# Patient Record
Sex: MALE | Race: WHITE | NOT HISPANIC OR LATINO | Employment: FULL TIME | URBAN - NONMETROPOLITAN AREA
[De-identification: names, ages, dates, MRNs, and addresses within clinical notes are randomized per-mention and may not be internally consistent; named-entity substitution may affect disease eponyms.]

---

## 2019-04-30 ENCOUNTER — HOSPITAL ENCOUNTER (OUTPATIENT)
Dept: ULTRASOUND IMAGING | Facility: HOSPITAL | Age: 59
Discharge: HOME | End: 2019-04-30

## 2019-04-30 ENCOUNTER — HOSPITAL ENCOUNTER (OUTPATIENT)
Dept: ULTRASOUND IMAGING | Facility: HOSPITAL | Age: 59
Discharge: HOME | End: 2019-04-30
Admitting: UROLOGY

## 2019-04-30 ENCOUNTER — OFFICE VISIT (OUTPATIENT)
Dept: UROLOGY | Facility: CLINIC | Age: 59
End: 2019-04-30

## 2019-04-30 VITALS — BODY MASS INDEX: 25.23 KG/M2 | HEIGHT: 74 IN | WEIGHT: 196.6 LBS

## 2019-04-30 DIAGNOSIS — R79.89 ABNORMAL LFTS (LIVER FUNCTION TESTS): ICD-10-CM

## 2019-04-30 DIAGNOSIS — N42.9 DISORDER OF PROSTATE: ICD-10-CM

## 2019-04-30 DIAGNOSIS — N50.819 ORCHALGIA: ICD-10-CM

## 2019-04-30 DIAGNOSIS — R68.82 DECREASED LIBIDO: Primary | ICD-10-CM

## 2019-04-30 DIAGNOSIS — N52.9 ERECTILE DYSFUNCTION, UNSPECIFIED ERECTILE DYSFUNCTION TYPE: ICD-10-CM

## 2019-04-30 DIAGNOSIS — R53.83 TIRED: ICD-10-CM

## 2019-04-30 PROCEDURE — 84153 ASSAY OF PSA TOTAL: CPT | Performed by: UROLOGY

## 2019-04-30 PROCEDURE — 76700 US EXAM ABDOM COMPLETE: CPT

## 2019-04-30 PROCEDURE — 76870 US EXAM SCROTUM: CPT

## 2019-04-30 PROCEDURE — 84403 ASSAY OF TOTAL TESTOSTERONE: CPT | Performed by: UROLOGY

## 2019-04-30 PROCEDURE — 76700 US EXAM ABDOM COMPLETE: CPT | Performed by: RADIOLOGY

## 2019-04-30 PROCEDURE — 99204 OFFICE O/P NEW MOD 45 MIN: CPT | Performed by: UROLOGY

## 2019-04-30 PROCEDURE — 80076 HEPATIC FUNCTION PANEL: CPT | Performed by: UROLOGY

## 2019-04-30 PROCEDURE — 76870 US EXAM SCROTUM: CPT | Performed by: RADIOLOGY

## 2019-04-30 PROCEDURE — 84443 ASSAY THYROID STIM HORMONE: CPT | Performed by: UROLOGY

## 2019-04-30 PROCEDURE — 36415 COLL VENOUS BLD VENIPUNCTURE: CPT | Performed by: UROLOGY

## 2019-04-30 RX ORDER — SILDENAFIL 100 MG/1
100 TABLET, FILM COATED ORAL DAILY PRN
Qty: 20 TABLET | Refills: 11 | Status: SHIPPED | OUTPATIENT
Start: 2019-04-30

## 2019-04-30 NOTE — PROGRESS NOTES
Chief Complaint:          Left testicle pain.    HPI:   59 y.o. male.  Pt has left testicle pain that is a mild irritation and has a throbbing nature it is 2 on scale of 10.  The discomfort does not interfere with work.  Pt has no sexual libido.  Pt has issues with maintaining erections.  HPI      Past Medical History:      History reviewed. No pertinent past medical history.      Current Meds:     No current outpatient medications on file.     No current facility-administered medications for this visit.         Allergies:      No Known Allergies     Past Surgical History:     History reviewed. No pertinent surgical history.      Social History:     Social History     Socioeconomic History   • Marital status:      Spouse name: Not on file   • Number of children: Not on file   • Years of education: Not on file   • Highest education level: Not on file   Tobacco Use   • Smoking status: Never Smoker   • Smokeless tobacco: Never Used   Substance and Sexual Activity   • Alcohol use: No     Frequency: Never   • Drug use: No       Family History:     Family History   Problem Relation Age of Onset   • No Known Problems Father    • Heart disease Mother    • Hypertension Mother        Review of Systems:     Review of Systems   Constitutional: Positive for fatigue.   HENT: Positive for sinus pressure. Negative for sinus pain.    Eyes: Negative for pain and redness.   Respiratory: Negative for chest tightness.    Cardiovascular: Negative for chest pain.   Gastrointestinal: Positive for abdominal pain. Negative for constipation and diarrhea.   Endocrine: Negative for heat intolerance.   Genitourinary: Positive for testicular pain. Negative for frequency and penile pain.   Musculoskeletal: Negative for back pain.   Skin: Negative for rash.   Allergic/Immunologic: Negative for food allergies.   Neurological: Negative for headaches.   Hematological: Bruises/bleeds easily.   Psychiatric/Behavioral: The patient is not  "nervous/anxious.              I have reviewed the follow portions of the patient's history and confirmed they are accurate today:  allergies, current medications, past family history, past medical history, past social history, past surgical history, problem list and ROS  Physical Exam:     Physical Exam   Constitutional: He is oriented to person, place, and time.   HENT:   Head: Normocephalic and atraumatic.   Right Ear: External ear normal.   Left Ear: External ear normal.   Nose: Nose normal.   Mouth/Throat: Oropharynx is clear and moist.   Eyes: Conjunctivae and EOM are normal. Pupils are equal, round, and reactive to light.   Neck: Normal range of motion. Neck supple. No thyromegaly present.   Cardiovascular: Normal rate, regular rhythm, normal heart sounds and intact distal pulses.   No murmur heard.  Pulmonary/Chest: Effort normal and breath sounds normal. No respiratory distress. He has no wheezes. He has no rales. He exhibits no tenderness.   Abdominal: Soft. Bowel sounds are normal. He exhibits no distension and no mass. There is no tenderness. No hernia.   Genitourinary: Rectum normal, prostate normal and penis normal.   Genitourinary Comments: Enlarged prostate no nodules   Musculoskeletal: Normal range of motion. He exhibits no edema or tenderness.   Lymphadenopathy:     He has no cervical adenopathy.   Neurological: He is alert and oriented to person, place, and time. No cranial nerve deficit. He exhibits normal muscle tone. Coordination normal.   Skin: Skin is warm. No rash noted.   Psychiatric: He has a normal mood and affect. His behavior is normal. Judgment and thought content normal.   Nursing note and vitals reviewed.      Ht 188 cm (74\")   Wt 89.2 kg (196 lb 9.6 oz)   BMI 25.24 kg/m²    Procedure:         Assessment:     Encounter Diagnoses   Name Primary?   • Decreased libido Yes   • Tired    • Erectile dysfunction, unspecified erectile dysfunction type    • Disorder of prostate    • Abnormal " LFTs (liver function tests)    • Orchalgia      No orders of the defined types were placed in this encounter.      Plan:   Will check a scrotal ultrasound for orchalgia  Will check a psa for his prostate  Will check an abdominal ultrasound for his hx of abnormal lft's   Will repeat his lft's  Will check a tsh and a testosterone for his tiredness and loss of libido  Will rx sildenafil for ED.  I discussed how it should be taken.    Patient's Body mass index is 25.24 kg/m². BMI is within normal parameters. No follow-up required..      Counseling was given to patient and family for the following topics impressions as follows: tiredness, ED and orchalgia and loss of libido. The interim medical history and current results were reviewed.  A treatment plan with follow-up was made for Decreased libido [R68.82].  This document has been electronically signed by Trav Rodarte MD April 30, 2019 3:46 PM

## 2019-05-01 LAB
ALBUMIN SERPL-MCNC: 4.7 G/DL (ref 3.5–5.2)
ALP SERPL-CCNC: 78 U/L (ref 39–117)
ALT SERPL W P-5'-P-CCNC: 47 U/L (ref 1–41)
AST SERPL-CCNC: 46 U/L (ref 1–40)
BILIRUB CONJ SERPL-MCNC: <0.2 MG/DL (ref 0.2–0.3)
BILIRUB INDIRECT SERPL-MCNC: ABNORMAL MG/DL
BILIRUB SERPL-MCNC: 1 MG/DL (ref 0.2–1.2)
PROT SERPL-MCNC: 7.6 G/DL (ref 6–8.5)
PSA SERPL-MCNC: 1.04 NG/ML (ref 0–4)
TESTOST SERPL-MCNC: 317 NG/DL (ref 193–740)
TSH SERPL DL<=0.05 MIU/L-ACNC: 2.83 MIU/ML (ref 0.27–4.2)

## 2019-05-31 ENCOUNTER — OFFICE VISIT (OUTPATIENT)
Dept: UROLOGY | Facility: CLINIC | Age: 59
End: 2019-05-31

## 2019-05-31 VITALS — WEIGHT: 196 LBS | BODY MASS INDEX: 25.15 KG/M2 | HEIGHT: 74 IN

## 2019-05-31 DIAGNOSIS — N42.9 DISORDER OF PROSTATE: ICD-10-CM

## 2019-05-31 DIAGNOSIS — N52.9 ERECTILE DYSFUNCTION, UNSPECIFIED ERECTILE DYSFUNCTION TYPE: Primary | ICD-10-CM

## 2019-05-31 DIAGNOSIS — N50.819 ORCHALGIA: ICD-10-CM

## 2019-05-31 PROCEDURE — 99213 OFFICE O/P EST LOW 20 MIN: CPT | Performed by: UROLOGY

## 2019-05-31 NOTE — PROGRESS NOTES
Chief Complaint:          Orchalgia.    HPI:   59 y.o. male.  Pt's scrotal ultrasound was normal.  Sildenafil helps him out.  HPI  Psa is 1.04.  His testosterone was 317.    Past Medical History:      History reviewed. No pertinent past medical history.      Current Meds:     Current Outpatient Medications   Medication Sig Dispense Refill   • sildenafil (VIAGRA) 100 MG tablet Take 1 tablet by mouth Daily As Needed for erectile dysfunction. 20 tablet 11     No current facility-administered medications for this visit.         Allergies:      No Known Allergies     Past Surgical History:     History reviewed. No pertinent surgical history.      Social History:     Social History     Socioeconomic History   • Marital status:      Spouse name: Not on file   • Number of children: Not on file   • Years of education: Not on file   • Highest education level: Not on file   Tobacco Use   • Smoking status: Never Smoker   • Smokeless tobacco: Never Used   Substance and Sexual Activity   • Alcohol use: No     Frequency: Never   • Drug use: No       Family History:     Family History   Problem Relation Age of Onset   • No Known Problems Father    • Heart disease Mother    • Hypertension Mother        Review of Systems:     Review of Systems   Constitutional: Negative for chills.   HENT: Negative for sneezing.    Respiratory: Negative for cough.    Cardiovascular: Negative for leg swelling.   Gastrointestinal: Negative for abdominal pain.   Genitourinary: Negative for dysuria, frequency, scrotal swelling, testicular pain and urgency.   Musculoskeletal: Negative for back pain.   Neurological: Negative for headaches.   Psychiatric/Behavioral: The patient is not nervous/anxious.        IPSS Questionnaire (AUA-7):  Over the past month…    1)  Incomplete Emptying  How often have you had a sensation of not emptying your bladder?  0 - Not at all   2)  Frequency  How often have you had to urinate less than every two hours? 0 - Not  at all   3)  Intermittency  How often have you found you stopped and started again several times when you urinated?  0 - Not at all   4) Urgency  How often have you found it difficult to postpone urination?  0 - Not at all   5) Weak Stream  How often have you had a weak urinary stream?  0 - Not at all   6) Straining  How often have you had to push or strain to begin urination?  0 - Not at all   7) Nocturia  How many times did you typically get up at night to urinate?  2 - 2 times   Total Score:  2       Quality of life due to urinary symptoms:  If you were to spend the rest of your life with your urinary condition the way it is now, how would you feel about that? 0-Delighted   Urine Leakage (Incontinence) 0-No Leakage       I have reviewed the follow portions of the patient's history and confirmed they are accurate today:  allergies, current medications, past family history, past medical history, past social history, past surgical history, problem list and ROS  Physical Exam:     Physical Exam   Constitutional: He is oriented to person, place, and time.   HENT:   Head: Normocephalic and atraumatic.   Right Ear: External ear normal.   Left Ear: External ear normal.   Nose: Nose normal.   Mouth/Throat: Oropharynx is clear and moist.   Eyes: Conjunctivae and EOM are normal. Pupils are equal, round, and reactive to light.   Neck: Normal range of motion. Neck supple. No thyromegaly present.   Cardiovascular: Normal rate, regular rhythm, normal heart sounds and intact distal pulses.   No murmur heard.  Pulmonary/Chest: Effort normal and breath sounds normal. No respiratory distress. He has no wheezes. He has no rales. He exhibits no tenderness.   Abdominal: Soft. Bowel sounds are normal. He exhibits no distension and no mass. There is no tenderness. No hernia.   Genitourinary: Rectum normal, prostate normal and penis normal.   Musculoskeletal: Normal range of motion. He exhibits no edema or tenderness.   Lymphadenopathy:  "    He has no cervical adenopathy.   Neurological: He is alert and oriented to person, place, and time. No cranial nerve deficit. He exhibits normal muscle tone. Coordination normal.   Skin: Skin is warm. No rash noted.   Psychiatric: He has a normal mood and affect. His behavior is normal. Judgment and thought content normal.   Nursing note and vitals reviewed.      Ht 188 cm (74\")   Wt 88.9 kg (196 lb)   BMI 25.16 kg/m²    Procedure:         Assessment:     Encounter Diagnoses   Name Primary?   • Erectile dysfunction, unspecified erectile dysfunction type Yes   • Orchalgia    • Disorder of prostate        Orders Placed This Encounter   Procedures   • PSA DIAGNOSTIC     Standing Status:   Future     Standing Expiration Date:   5/31/2022   • PSA DIAGNOSTIC     Standing Status:   Future     Standing Expiration Date:   5/31/2020     Plan:   Will see pt back in a year with a psa    Patient's Body mass index is 25.16 kg/m². BMI is within normal parameters. No follow-up required..      Counseling was given to patient for the following topics diagnostic results including: scrotal ultrasound and labs. The interim medical history and current results were reviewed.  A treatment plan with follow-up was made for Erectile dysfunction, unspecified erectile dysfunction type [N52.9]. I spent 24 minutes face to face with Celestino Holden and 80 percentage was spent in counseling.       This document has been electronically signed by Trav Rodarte MD May 31, 2019 2:30 PM      "

## 2020-04-08 DIAGNOSIS — N52.01 ERECTILE DYSFUNCTION DUE TO ARTERIAL INSUFFICIENCY: Primary | ICD-10-CM

## 2020-04-08 RX ORDER — SILDENAFIL CITRATE 20 MG/1
TABLET ORAL
Qty: 50 TABLET | Refills: 11 | Status: SHIPPED | OUTPATIENT
Start: 2020-04-08

## 2021-03-25 DIAGNOSIS — N52.01 ERECTILE DYSFUNCTION DUE TO ARTERIAL INSUFFICIENCY: ICD-10-CM

## 2021-03-25 RX ORDER — SILDENAFIL CITRATE 20 MG/1
TABLET ORAL
Qty: 50 TABLET | Refills: 11 | OUTPATIENT
Start: 2021-03-25

## 2024-08-24 ENCOUNTER — APPOINTMENT (OUTPATIENT)
Dept: ULTRASOUND IMAGING | Facility: HOSPITAL | Age: 64
End: 2024-08-24
Payer: COMMERCIAL

## 2024-08-24 ENCOUNTER — HOSPITAL ENCOUNTER (EMERGENCY)
Facility: HOSPITAL | Age: 64
Discharge: HOME | End: 2024-08-24
Attending: STUDENT IN AN ORGANIZED HEALTH CARE EDUCATION/TRAINING PROGRAM
Payer: COMMERCIAL

## 2024-08-24 ENCOUNTER — APPOINTMENT (OUTPATIENT)
Dept: GENERAL RADIOLOGY | Facility: HOSPITAL | Age: 64
End: 2024-08-24
Payer: COMMERCIAL

## 2024-08-24 VITALS
HEART RATE: 59 BPM | DIASTOLIC BLOOD PRESSURE: 89 MMHG | HEIGHT: 73 IN | TEMPERATURE: 97.8 F | BODY MASS INDEX: 27.17 KG/M2 | WEIGHT: 205 LBS | RESPIRATION RATE: 14 BRPM | SYSTOLIC BLOOD PRESSURE: 144 MMHG | OXYGEN SATURATION: 96 %

## 2024-08-24 DIAGNOSIS — S92.335A CLOSED NONDISPLACED FRACTURE OF THIRD METATARSAL BONE OF LEFT FOOT, INITIAL ENCOUNTER: Primary | ICD-10-CM

## 2024-08-24 LAB
ALBUMIN SERPL-MCNC: 4.1 G/DL (ref 3.5–5.2)
ALBUMIN/GLOB SERPL: 1.6 G/DL
ALP SERPL-CCNC: 107 U/L (ref 39–117)
ALT SERPL W P-5'-P-CCNC: 17 U/L (ref 1–41)
ANION GAP SERPL CALCULATED.3IONS-SCNC: 10.3 MMOL/L (ref 5–15)
AST SERPL-CCNC: 18 U/L (ref 1–40)
BASOPHILS # BLD AUTO: 0.04 10*3/MM3 (ref 0–0.2)
BASOPHILS NFR BLD AUTO: 0.5 % (ref 0–1.5)
BILIRUB SERPL-MCNC: 0.4 MG/DL (ref 0–1.2)
BILIRUB UR QL STRIP: NEGATIVE
BUN SERPL-MCNC: 17 MG/DL (ref 8–23)
BUN/CREAT SERPL: 15.9 (ref 7–25)
CALCIUM SPEC-SCNC: 9.2 MG/DL (ref 8.6–10.5)
CHLORIDE SERPL-SCNC: 106 MMOL/L (ref 98–107)
CLARITY UR: CLEAR
CO2 SERPL-SCNC: 23.7 MMOL/L (ref 22–29)
COLOR UR: YELLOW
CREAT SERPL-MCNC: 1.07 MG/DL (ref 0.76–1.27)
CRP SERPL-MCNC: 0.46 MG/DL (ref 0–0.5)
DEPRECATED RDW RBC AUTO: 43.2 FL (ref 37–54)
EGFRCR SERPLBLD CKD-EPI 2021: 78 ML/MIN/1.73
EOSINOPHIL # BLD AUTO: 0.32 10*3/MM3 (ref 0–0.4)
EOSINOPHIL NFR BLD AUTO: 4.3 % (ref 0.3–6.2)
ERYTHROCYTE [DISTWIDTH] IN BLOOD BY AUTOMATED COUNT: 13.4 % (ref 12.3–15.4)
ERYTHROCYTE [SEDIMENTATION RATE] IN BLOOD: 9 MM/HR (ref 0–20)
GLOBULIN UR ELPH-MCNC: 2.6 GM/DL
GLUCOSE SERPL-MCNC: 91 MG/DL (ref 65–99)
GLUCOSE UR STRIP-MCNC: NEGATIVE MG/DL
HCT VFR BLD AUTO: 43 % (ref 37.5–51)
HGB BLD-MCNC: 14 G/DL (ref 13–17.7)
HGB UR QL STRIP.AUTO: NEGATIVE
HOLD SPECIMEN: NORMAL
HOLD SPECIMEN: NORMAL
IMM GRANULOCYTES # BLD AUTO: 0.02 10*3/MM3 (ref 0–0.05)
IMM GRANULOCYTES NFR BLD AUTO: 0.3 % (ref 0–0.5)
KETONES UR QL STRIP: NEGATIVE
LEUKOCYTE ESTERASE UR QL STRIP.AUTO: NEGATIVE
LYMPHOCYTES # BLD AUTO: 1.5 10*3/MM3 (ref 0.7–3.1)
LYMPHOCYTES NFR BLD AUTO: 20.4 % (ref 19.6–45.3)
MCH RBC QN AUTO: 28.6 PG (ref 26.6–33)
MCHC RBC AUTO-ENTMCNC: 32.6 G/DL (ref 31.5–35.7)
MCV RBC AUTO: 87.8 FL (ref 79–97)
MONOCYTES # BLD AUTO: 0.67 10*3/MM3 (ref 0.1–0.9)
MONOCYTES NFR BLD AUTO: 9.1 % (ref 5–12)
NEUTROPHILS NFR BLD AUTO: 4.81 10*3/MM3 (ref 1.7–7)
NEUTROPHILS NFR BLD AUTO: 65.4 % (ref 42.7–76)
NITRITE UR QL STRIP: NEGATIVE
NRBC BLD AUTO-RTO: 0 /100 WBC (ref 0–0.2)
PH UR STRIP.AUTO: <=5 [PH] (ref 5–8)
PLATELET # BLD AUTO: 230 10*3/MM3 (ref 140–450)
PMV BLD AUTO: 10 FL (ref 6–12)
POTASSIUM SERPL-SCNC: 4.1 MMOL/L (ref 3.5–5.2)
PROT SERPL-MCNC: 6.7 G/DL (ref 6–8.5)
PROT UR QL STRIP: NEGATIVE
RBC # BLD AUTO: 4.9 10*6/MM3 (ref 4.14–5.8)
SODIUM SERPL-SCNC: 140 MMOL/L (ref 136–145)
SP GR UR STRIP: 1.01 (ref 1–1.03)
URATE SERPL-MCNC: 5.6 MG/DL (ref 3.4–7)
UROBILINOGEN UR QL STRIP: NORMAL
WBC NRBC COR # BLD AUTO: 7.36 10*3/MM3 (ref 3.4–10.8)
WHOLE BLOOD HOLD COAG: NORMAL
WHOLE BLOOD HOLD SPECIMEN: NORMAL

## 2024-08-24 PROCEDURE — 84550 ASSAY OF BLOOD/URIC ACID: CPT | Performed by: PHYSICIAN ASSISTANT

## 2024-08-24 PROCEDURE — 80053 COMPREHEN METABOLIC PANEL: CPT | Performed by: PHYSICIAN ASSISTANT

## 2024-08-24 PROCEDURE — 85025 COMPLETE CBC W/AUTO DIFF WBC: CPT | Performed by: PHYSICIAN ASSISTANT

## 2024-08-24 PROCEDURE — 93971 EXTREMITY STUDY: CPT

## 2024-08-24 PROCEDURE — 85652 RBC SED RATE AUTOMATED: CPT | Performed by: PHYSICIAN ASSISTANT

## 2024-08-24 PROCEDURE — 86140 C-REACTIVE PROTEIN: CPT | Performed by: PHYSICIAN ASSISTANT

## 2024-08-24 PROCEDURE — 73630 X-RAY EXAM OF FOOT: CPT | Performed by: RADIOLOGY

## 2024-08-24 PROCEDURE — 73630 X-RAY EXAM OF FOOT: CPT

## 2024-08-24 PROCEDURE — 81003 URINALYSIS AUTO W/O SCOPE: CPT | Performed by: PHYSICIAN ASSISTANT

## 2024-08-24 PROCEDURE — 93971 EXTREMITY STUDY: CPT | Performed by: RADIOLOGY

## 2024-08-24 PROCEDURE — 99284 EMERGENCY DEPT VISIT MOD MDM: CPT

## 2024-08-24 PROCEDURE — 36415 COLL VENOUS BLD VENIPUNCTURE: CPT

## 2024-08-24 RX ORDER — HYDROCODONE BITARTRATE AND ACETAMINOPHEN 5; 325 MG/1; MG/1
1 TABLET ORAL EVERY 6 HOURS PRN
Qty: 12 TABLET | Refills: 0 | Status: SHIPPED | OUTPATIENT
Start: 2024-08-24

## 2024-08-24 NOTE — ED PROVIDER NOTES
Subjective   History of Present Illness  65 yo male pt no past medical problems presents to the ED with complaints of left foot pain and swelling.  States he has had pain and swelling x 1 week. Pt denies any injuries. 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 States he could have curled his ankle and not thought much about it.  He states he has a prior injury to his foot, 15 years ago.       History provided by:  Patient   used: No        Review of Systems   Constitutional: Negative.    HENT: Negative.     Eyes: Negative.    Respiratory: Negative.     Cardiovascular: Negative.    Gastrointestinal: Negative.    Endocrine: Negative.    Genitourinary: Negative.    Musculoskeletal:         Left foot pain and swelling    Skin: Negative.    Allergic/Immunologic: Negative.    Neurological: Negative.    Hematological: Negative.    Psychiatric/Behavioral: Negative.     All other systems reviewed and are negative.      No past medical history on file.    No Known Allergies    No past surgical history on file.    Family History   Problem Relation Age of Onset    No Known Problems Father     Heart disease Mother     Hypertension Mother        Social History     Socioeconomic History    Marital status:    Tobacco Use    Smoking status: Never    Smokeless tobacco: Never   Substance and Sexual Activity    Alcohol use: No    Drug use: No           Objective   Physical Exam  Vitals and nursing note reviewed.   Constitutional:       Appearance: Normal appearance. He is normal weight.   HENT:      Head: Normocephalic and atraumatic.      Right Ear: External ear normal.      Left Ear: External ear normal.      Nose: Nose normal.      Mouth/Throat:      Mouth: Mucous membranes are moist.      Pharynx: Oropharynx is clear.   Eyes:      Extraocular Movements: Extraocular movements intact.      Conjunctiva/sclera: Conjunctivae normal.      Pupils: Pupils are equal, round, and reactive to light.   Cardiovascular:      Rate  and Rhythm: Normal rate and regular rhythm.      Pulses: Normal pulses.      Heart sounds: Normal heart sounds.   Pulmonary:      Effort: Pulmonary effort is normal.      Breath sounds: Normal breath sounds.   Abdominal:      General: Abdomen is flat. Bowel sounds are normal.      Palpations: Abdomen is soft.   Musculoskeletal:         General: Normal range of motion.      Cervical back: Normal range of motion and neck supple.        Legs:    Skin:     General: Skin is warm and dry.      Capillary Refill: Capillary refill takes less than 2 seconds.   Neurological:      General: No focal deficit present.      Mental Status: He is alert and oriented to person, place, and time. Mental status is at baseline.   Psychiatric:         Mood and Affect: Mood normal.         Behavior: Behavior normal.         Thought Content: Thought content normal.         Judgment: Judgment normal.         Procedures           ED Course  ED Course as of 08/24/24 1405   Sat Aug 24, 2024   1404 US Venous Doppler Lower Extremity Left (duplex) [ML]   1404 XR Foot 3+ View Left [ML]      ED Course User Index  [ML] Claudia Cooper PA                               US Venous Doppler Lower Extremity Left (duplex)    Result Date: 8/24/2024  Negative for left lower extremity DVT.   This report was finalized on 8/24/2024 1:58 PM by Alex Pallas, DO.      XR Foot 3+ View Left    Result Date: 8/24/2024  Nondisplaced fracture of the midshaft of the third metatarsal.   This report was finalized on 8/24/2024 1:54 PM by Alex Pallas, DO.                   Medical Decision Making  63 yo male pt no past medical problems presents to the ED with complaints of left foot pain and swelling.  States he has had pain and swelling x 1 week. Pt denies any injuries. 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 States he could have curled his ankle and not thought much about it.  He states he has a prior injury to his foot, 15 years ago.     ED stay uncomplicated. Pt placed in a  boot and crutches. He will f/u with podiatry. Discussed sx and red flags that would warrant return to the ED.      Amount and/or Complexity of Data Reviewed  Labs: ordered.  Radiology: ordered.        Final diagnoses:   Closed nondisplaced fracture of third metatarsal bone of left foot, initial encounter       ED Disposition  ED Disposition       ED Disposition   Discharge    Condition   Stable    Comment   --               Kirk Duque, DPM  [address] [place] [address] [phone]    Schedule an appointment as soon as possible for a visit in 2 days           Medication List      No changes were made to your prescriptions during this visit.            Claudia Cooper PA  08/24/24 0906